# Patient Record
Sex: FEMALE | Race: WHITE | Employment: OTHER | URBAN - METROPOLITAN AREA
[De-identification: names, ages, dates, MRNs, and addresses within clinical notes are randomized per-mention and may not be internally consistent; named-entity substitution may affect disease eponyms.]

---

## 2018-11-14 ENCOUNTER — HOSPITAL ENCOUNTER (EMERGENCY)
Age: 68
Discharge: HOME | End: 2018-11-14
Payer: MEDICARE

## 2018-11-14 VITALS
DIASTOLIC BLOOD PRESSURE: 102 MMHG | OXYGEN SATURATION: 98 % | BODY MASS INDEX: 30.1 KG/M2 | HEIGHT: 71 IN | WEIGHT: 215 LBS | RESPIRATION RATE: 18 BRPM | HEART RATE: 72 BPM | TEMPERATURE: 97.6 F | SYSTOLIC BLOOD PRESSURE: 166 MMHG

## 2018-11-14 DIAGNOSIS — Z87.898 HISTORY OF EPISTAXIS: Primary | ICD-10-CM

## 2018-11-14 PROCEDURE — 99282 EMERGENCY DEPT VISIT SF MDM: CPT

## 2018-11-14 RX ORDER — OXYMETAZOLINE HYDROCHLORIDE 0.05 G/100ML
2 SPRAY NASAL 2 TIMES DAILY
Qty: 1 BOTTLE | Refills: 0 | Status: SHIPPED | OUTPATIENT
Start: 2018-11-14 | End: 2018-12-14

## 2018-11-14 NOTE — ED PROVIDER NOTES
eMERGENCY dEPARTMENT eNCOUnter        PCP: Elvi Egan MD    CHIEF COMPLAINT  Chief Complaint   Patient presents with    Epistaxis     ongoing for past 12 hours \"off and on\" per patient; denies known trauma; denies being on blood thinning medications        HPI    Nella Maguire is a 70 y.o. female who presents with intermittent nosebleed with onset of 4am today. Patient reports that it's in the right side of her nose that has been bleeding on and off slightly throughout today. Patient reports that it has been \"dripping\" today but denies having large amounts of blood loss. The context has been of spontaneous onsets. The patient is taking no blood thinners. The quality of the bleeding is bright red blood. There no aggravating or alleviating factors. Direct pressure and ice have stopped patient's nosebleed several times today. The patient has no associated symptoms. Patient denies fever, chills, spontaneous bruising, other spontaneous bleeding, lightheadedness, dizziness, hematochezia, melena, emesis, hematemesis. REVIEW OF SYSTEMS    General: No Fever  Cardiac: No Chest Pain, Denies near syncope / syncope. Respiratory: No shortness of breath or or difficulty breathing  GI: No abdominal pain. No Bloody Stool or dark stools. : No Dysuria or Hematuria  Neurologic: No LOC, no lightheadedness  Integument: No spontaneous bruising. No rash. All other review of systems are negative  See HPI and nursing notes for additional information       PAST MEDICAL & SURGICAL HISTORY    No past medical history on file. Past Surgical History:   Procedure Laterality Date    BREAST SURGERY      CHOLECYSTECTOMY      JOINT REPLACEMENT         CURRENT MEDICATIONS    Current Outpatient Rx   Medication Sig Dispense Refill    oxymetazoline (12 HOUR NASAL SPRAY) 0.05 % nasal spray 2 sprays by Nasal route 2 times daily . DO NOT USE LONGER THAN 3 DAYS.  1 Bottle 0    cloNIDine (CATAPRES) 0.2 MG tablet Take 0.2

## 2020-10-29 ENCOUNTER — OFFICE VISIT (OUTPATIENT)
Dept: PRIMARY CARE CLINIC | Age: 70
End: 2020-10-29
Payer: MEDICARE

## 2020-10-29 ENCOUNTER — HOSPITAL ENCOUNTER (OUTPATIENT)
Age: 70
Setting detail: SPECIMEN
Discharge: HOME | End: 2020-10-29
Payer: MEDICARE

## 2020-10-29 VITALS — TEMPERATURE: 97.3 F | OXYGEN SATURATION: 97 % | HEART RATE: 100 BPM

## 2020-10-29 PROCEDURE — U0002 COVID-19 LAB TEST NON-CDC: HCPCS

## 2020-10-29 PROCEDURE — 99211 OFF/OP EST MAY X REQ PHY/QHP: CPT | Performed by: INTERNAL MEDICINE

## 2020-10-30 LAB
SARS-COV-2: NOT DETECTED
SOURCE: NORMAL

## 2023-08-01 ENCOUNTER — TRANSCRIBE ORDERS (OUTPATIENT)
Dept: ADMINISTRATIVE | Age: 73
End: 2023-08-01

## 2023-08-01 DIAGNOSIS — G60.0 HEREDITARY SENSORY-MOTOR NEUROPATHY, TYPE III: Primary | ICD-10-CM

## 2023-08-16 ENCOUNTER — HOSPITAL ENCOUNTER (OUTPATIENT)
Dept: NEUROLOGY | Age: 73
Discharge: HOME | End: 2023-08-16
Payer: MEDICARE

## 2023-08-16 DIAGNOSIS — G60.0 HEREDITARY SENSORY-MOTOR NEUROPATHY, TYPE III: ICD-10-CM

## 2023-08-16 PROCEDURE — 95886 MUSC TEST DONE W/N TEST COMP: CPT

## 2023-08-16 PROCEDURE — 95910 NRV CNDJ TEST 7-8 STUDIES: CPT

## 2023-08-16 NOTE — PROCEDURES
[facility], [address] [facility]                             ELECTROMYOGRAM REPORT    PATIENT NAME: Sana Cannon               :        1950  MED REC NO:   [id number]                            ROOM:  ACCOUNT NO:   [de-identified]                           ADMIT DATE: 2023  PROVIDER:     Lenn Fothergill, MD    DATE OF EM2023    REFERRING PROVIDER: Elena Chong    REASON FOR STUDY:  The patient had right leg weakness and spasms. FINDINGS:  Motor nerve conduction velocities are normal in all the  nerves tested. F-wave latencies are delayed in all the nerves tested. Distal motor latencies are normal in all the nerves tested. Distal sensory latencies could not be obtained in the right superficial  peroneal nerve and are normal in other nerves tested. Amplitudes of motor and sensory responses are decreased in all the  nerves tested. On concentric needle electrode examination, mild denervation changes are  present in the right L5 root distribution. CLINICAL INTERPRETATION:  Electrodiagnostic studies are showing changes  of mild right L5 radiculopathy. The patient is being tried on conservative management. If clinically indicated, we shall proceed with imaging of the lumbar canal to look for compromise of the spinal canal  and/or foramina. The patient is being tried on conservative management. The patient has changes of peripheral neuropathic process by looking at  the delayed F-wave latencies and low amplitudes of motor and sensory  responses. If clinically indicated, we shall screen the patient for  causes of peripheral neuropathy such as diabetes mellitus,  hypothyroidism, exposure to toxins, autoimmune disorder, etc.    If clinically indicated, we could repeat the study in a year.         Lenn Fothergill, MD    D: 2023 14:21:08       T: 2023 14:25:48     [id number]  Job#:

## 2023-10-05 PROBLEM — M54.16 LUMBAR RADICULOPATHY: Status: ACTIVE | Noted: 2023-10-05

## 2023-10-05 PROBLEM — G89.29 CHRONIC PAIN OF RIGHT KNEE: Status: ACTIVE | Noted: 2023-10-05

## 2023-10-05 PROBLEM — M25.561 CHRONIC PAIN OF RIGHT KNEE: Status: ACTIVE | Noted: 2023-10-05

## 2023-10-05 PROBLEM — M16.11 PRIMARY OSTEOARTHRITIS OF RIGHT HIP: Status: ACTIVE | Noted: 2023-10-05

## 2023-10-12 ENCOUNTER — APPOINTMENT (OUTPATIENT)
Dept: PHYSICAL THERAPY | Facility: CLINIC | Age: 73
End: 2023-10-12
Payer: MEDICARE

## 2023-10-12 NOTE — PROGRESS NOTES
Physical Therapy Evaluation    Patient Name: Mary Carrillo  MRN: [id number]    Current Problem  No diagnosis found.  Insurance    Insurance reviewed   Visit number: **  Approved number of visits: **  **insurance name  **visits/yr or copay  **auth/no auth      Subjective   General: Patient is a 74 y/o F who is here today for c/o low back pain with RLE weakness.     Precautions:    Pain:    Reviewed medical screening form with pt and medical screening assessed    Imaging:     Objective   Posture:  Transfers:  Gait:    Lumbar ROM (* indicates pain)  Flex:   Ext:  R SB:   L SB:     Supine Hip PROM (* indicates pain)  Flexion: L ; R   IR: L ; R   ER: L ; R:    Hip MMT (* indicates pain)  Flex: L ; R  Ext: L ; R  ABD: L ; R    Repeated loaded flexion:   Repeated loaded ext:   SKTC:   DKTC:    WILL:   PPU:    Slump:   90/90:     Palpation:   Outcome Measures:  {PT Outcome Measures:87684}     Treatment: See HEP below    EDUCATION/HEP:    Assessment:      Clinical Presentation: Stable / Evolving / Unstable    Level of Complexity: Low / Moderate / High    Goals:      Plan  x/week for  visits

## 2023-11-07 ENCOUNTER — EVALUATION (OUTPATIENT)
Dept: PHYSICAL THERAPY | Facility: CLINIC | Age: 73
End: 2023-11-07
Payer: MEDICARE

## 2023-11-07 DIAGNOSIS — M54.16 LUMBAR RADICULOPATHY: Primary | ICD-10-CM

## 2023-11-07 PROCEDURE — 97161 PT EVAL LOW COMPLEX 20 MIN: CPT | Mod: GP

## 2023-11-07 PROCEDURE — 97110 THERAPEUTIC EXERCISES: CPT | Mod: GP

## 2023-11-07 ASSESSMENT — PAIN SCALES - GENERAL: PAINLEVEL_OUTOF10: 1

## 2023-11-07 ASSESSMENT — ENCOUNTER SYMPTOMS
OCCASIONAL FEELINGS OF UNSTEADINESS: 0
LOSS OF SENSATION IN FEET: 0
DEPRESSION: 0

## 2023-11-07 ASSESSMENT — PAIN - FUNCTIONAL ASSESSMENT: PAIN_FUNCTIONAL_ASSESSMENT: 0-10

## 2023-11-07 NOTE — PROGRESS NOTES
Physical Therapy Evaluation    Patient Name: Mary Carrillo  MRN: [id number]    Current Problem  1. Lumbar radiculopathy  Follow Up In Physical Therapy        Insurance    Insurance reviewed   Visit number: 1  Approved number of visits: BMN  [employer] Medicare  Visits BMN  No auth required      Subjective   General: Patient is a 72 y/o F who is here today for c/o low back pain and BLE weakness (R>L). She did have nerve conduction testing, which revealed BLE peripheral neuropathy. Pt feels that her RLE is not as strong as her LLE. Pt w/ h/o R THR in December of 2022. Pt denies numbness/tingling in BLEs, pt does have pain in anterior R quad at times, unable to identify aggravating factors. Pt denies any buckling/instability in BLEs. Pt denies any new onset bowel/bladder dysfunction, denies saddle anesthesia. Pt reports that her biggest complaint is her loss of strength and weakness, not so much her pain at this time.     Pt Goal for PT: improve strength and flexibility    Precautions:  none  Pain:  1/10, low back and BLEs  Pain Exacerbating Factors: walking, sit to/from stands, bending, lifting, carrying  Pain Relieving Factors: meloxicam, pilates, swimming    Reviewed medical screening form with pt and medical screening assessed    Objective   Posture: forward head, rounded shoulders, decreased lumbar lordosis  Transfers: increased use of UEs  Gait: increased R lateral trunk lean    Lumbar ROM (* indicates pain)  Flex: to floor   Ext: min reversal*  R SB: to distal thigh*  L SB: to distal thigh*    Supine Hip PROM (* indicates pain)  WNL B    Hip MMT (* indicates pain)  Flex: 4/5 L ; 4/5 R  Ext: 4-/5 L ; 4-/5 R  ABD: 4/5 L ; 4/5 R    Repeated loaded flexion: no change  Repeated loaded ext: no change  SKTC: no change  DKTC: no change  WILL: no change  PPU: no change    Slump: (-) B  90/90: lacking 15-20 degrees B    Palpation: slight TTP B lumbar paraspinals    Outcome Measures:  Other Measures  Lower Extremity Funtional  Score (LEFS): 51/80  Oswestry Disablity Index (CHUCKIE): 8/50; 16%     Treatment: See HEP below    EDUCATION/HEP:  Access Code: [email or web address]  URL: [email or web address]  Date: 11/07/2023  Prepared by: Hazel Kerns    Exercises  - Supine Lower Trunk Rotation  - 1 x daily - 7 x weekly - 1-2 sets - 10 reps - 5 sec hold  - Supine Piriformis Stretch with Foot on Ground  - 1 x daily - 7 x weekly - 3 sets - 30 sec hold  - Supine Figure 4 Piriformis Stretch  - 1 x daily - 7 x weekly - 3 sets - 30 sec hold  - Supine Posterior Pelvic Tilt  - 1 x daily - 7 x weekly - 1-2 sets - 10 reps - 5 sec hold  - Supine Bridge  - 1 x daily - 7 x weekly - 1-2 sets - 10 reps - 2-3 sec hold    Assessment:  Patient is a 72 y/o F who participated in PT evaluation this date for c/o low back and BLE pain and weakness. Patient presents with pain, decreased lumbar ROM and impaired tissue mobility, decreased BLE strength, impaired gait and impaired posture. Due to these impairments, pt has increased difficulty with lifting, carrying, walking, prolonged standing and sitting, transfers, performing recreational hobbies/activities, and performing ADLs/IADLs. Pt would benefit from PT services to decrease pain, increase ROM/tissue mobility, improve strength and posture to facilitate return to prior level of activities as able.     Problem List: activity limitations, ADLs/IADLs/self care skills, decreased functional level, decreased knowledge of HEP, decreased knowledge of precautions, flexibility, pain, participation restrictions, posture, range of motion/joint mobility and strength.      Clinical Presentation: Stable    Level of Complexity: Low     Goals:  Pt will report at least 75% improvement in low back and BLE pain during everyday activities.  Pt will demo >/= 4+/5 strength of BLEs without pain.  Pt will demo full and symmetrical AROM of lumbar spine without pain.  Pt will ambulate 50ft without gait deviation or pain.  Pt will  improve LEFS score by at least 9 points (MCID) to reflect improvement in ADLs/pain reduction, as well as improvement in functional abilities. Baseline 11/7/23: 51/80  Pt will demonstrate independence and report compliance with HEP.     Plan  1x/week for 8 visits     Planned interventions include: blood flow restriction, aquatic therapy, biofeedback, cryotherapy, dry needling, edema control, education/instruction, electrical stimulation, gait training, home program, hot pack, kinesiotaping, manual therapy, neuromuscular re-education, self care/home management, therapeutic activities, therapeutic exercises, ultrasound and vasopneumatic device w/ cold.

## 2023-11-07 NOTE — LETTER
November 7, 2023     Patient: Mary Carrillo   YOB: 1950   Date of Visit: 11/7/2023       To Whom it May Concern:    Mary Carrillo was seen in my clinic on 11/7/2023. She {Return to school/sport:58571}.    If you have any questions or concerns, please don't hesitate to call.         Sincerely,          Hazel Kerns, PT        CC: No Recipients

## 2023-11-07 NOTE — LETTER
November 7, 2023     Patient: Mary Carrillo   YOB: 1950   Date of Visit: 11/7/2023       To Whom It May Concern:    It is my medical opinion that Mary Carrillo {Work release (duty restriction):82729}.    If you have any questions or concerns, please don't hesitate to call.         Sincerely,        Hazel Kerns, PT    CC: No Recipients

## 2023-11-15 ENCOUNTER — TREATMENT (OUTPATIENT)
Dept: PHYSICAL THERAPY | Facility: CLINIC | Age: 73
End: 2023-11-15
Payer: MEDICARE

## 2023-11-15 DIAGNOSIS — M54.16 LUMBAR RADICULOPATHY: ICD-10-CM

## 2023-11-21 ENCOUNTER — TREATMENT (OUTPATIENT)
Dept: PHYSICAL THERAPY | Facility: CLINIC | Age: 73
End: 2023-11-21
Payer: MEDICARE

## 2023-11-21 DIAGNOSIS — M54.16 LUMBAR RADICULOPATHY: ICD-10-CM

## 2023-11-21 PROCEDURE — 97110 THERAPEUTIC EXERCISES: CPT | Mod: GP

## 2023-11-21 NOTE — PROGRESS NOTES
Physical Therapy Treatment    Patient Name: Mary Carrillo  MRN: [id number]    Current Problem  1. Lumbar radiculopathy  Follow Up In Physical Therapy      Insurance     Insurance reviewed   Visit number: 2  Approved number of visits: BMN  [employer] Medicare  Visits BMN  No auth required    Subjective   General  Pt reports feeling pretty good today, no c/o pain noted. Reports that her biggest complaint continues to be that she feels she has no strength in her RLE.  Precautions  none  Pain  0/10    Objective     Treatments:  AP: 6'  Bridge w/ hip abduction: GTB 2x10   Hooklying resisted march w/ TA activation: GTB 2x10 B  Hooklying SL bent knee fallout: GTB 2x10 B  Sit to/from stands w/out UE: GTB 2x10  Standing 3-way hip on black foam pad: RTB x15 each B    OP EDUCATION/HEP:  Access Code: [email or web address]  URL: [email or web address]  Date: 11/21/2023  Prepared by: Hazel Kerns    Exercises  - Bridge with Hip Abduction and Resistance - Ground Touches  - 1 x daily - 7 x weekly - 2 sets - 10 reps  - Supine March with Resistance Band  - 1 x daily - 7 x weekly - 2 sets - 10 reps  - Hooklying Single Leg Bent Knee Fallouts with Resistance  - 1 x daily - 7 x weekly - 2 sets - 10 reps  - Sit to Stand with Resistance Around Legs  - 1 x daily - 7 x weekly - 2 sets - 10 reps  - Standing Hip Flexion with Resistance Loop  - 1 x daily - 7 x weekly - 1-2 sets - 10 reps  - Hip Abduction with Resistance Loop  - 1 x daily - 7 x weekly - 1-2 sets - 10 reps  - Hip Extension with Resistance Loop  - 1 x daily - 7 x weekly - 1-2 sets - 10 reps    Assessment   Treatment session focused on hip and core strengthening this date. Added several new exercises, HEP updated as appropriate. Pt has no c/o pain w/ all exercises, only muscle fatigue as expected. PT to continue to address pain, ROM/tissue mobility, strength, gait and balance to facilitate return to prior level of activities as able.     Plan   Progress w/ POC, as  tolerated.

## 2023-11-28 ENCOUNTER — APPOINTMENT (OUTPATIENT)
Dept: PHYSICAL THERAPY | Facility: CLINIC | Age: 73
End: 2023-11-28
Payer: MEDICARE

## 2023-12-05 ENCOUNTER — OFFICE VISIT (OUTPATIENT)
Dept: ORTHOPEDIC SURGERY | Facility: CLINIC | Age: 73
End: 2023-12-05
Payer: MEDICARE

## 2023-12-05 ENCOUNTER — TREATMENT (OUTPATIENT)
Dept: PHYSICAL THERAPY | Facility: CLINIC | Age: 73
End: 2023-12-05
Payer: MEDICARE

## 2023-12-05 ENCOUNTER — ANCILLARY PROCEDURE (OUTPATIENT)
Dept: RADIOLOGY | Facility: CLINIC | Age: 73
End: 2023-12-05
Payer: MEDICARE

## 2023-12-05 DIAGNOSIS — Z47.1 AFTERCARE FOLLOWING RIGHT HIP JOINT REPLACEMENT SURGERY: Primary | ICD-10-CM

## 2023-12-05 DIAGNOSIS — Z96.641 STATUS POST RIGHT HIP REPLACEMENT: ICD-10-CM

## 2023-12-05 DIAGNOSIS — M54.16 LUMBAR RADICULOPATHY: ICD-10-CM

## 2023-12-05 DIAGNOSIS — Z96.641 AFTERCARE FOLLOWING RIGHT HIP JOINT REPLACEMENT SURGERY: Primary | ICD-10-CM

## 2023-12-05 PROCEDURE — 73502 X-RAY EXAM HIP UNI 2-3 VIEWS: CPT | Mod: RT

## 2023-12-05 PROCEDURE — 99213 OFFICE O/P EST LOW 20 MIN: CPT | Performed by: ORTHOPAEDIC SURGERY

## 2023-12-05 PROCEDURE — 1159F MED LIST DOCD IN RCRD: CPT | Performed by: ORTHOPAEDIC SURGERY

## 2023-12-05 PROCEDURE — 73502 X-RAY EXAM HIP UNI 2-3 VIEWS: CPT | Mod: RIGHT SIDE | Performed by: ORTHOPAEDIC SURGERY

## 2023-12-05 PROCEDURE — 1036F TOBACCO NON-USER: CPT | Performed by: ORTHOPAEDIC SURGERY

## 2023-12-05 PROCEDURE — 1125F AMNT PAIN NOTED PAIN PRSNT: CPT | Performed by: ORTHOPAEDIC SURGERY

## 2023-12-05 NOTE — PROGRESS NOTES
History of present illness    73-year-old female here for 1 year follow-up status post total hip replacement right she states she never really had any hip pain whatsoever since her hip surgery her biggest issue has been weakness on the right side it does not seem to be getting worse but does not seem to be getting any better.  She is ambulating without assist device no fevers or chills no numbness just weakness      Past medical , Surgical, Family and social history reviewed.      Physical exam  General: No acute distress and breathing comfortably.  Patient is pleasant and cooperative with the examination.    Extremity  Hip incision well-healed good range of motion neurologically intact distally brisk cap refill compartments soft calf is nontender    Diagnostics      XR hip right with pelvis when performed 2 or 3 views    Result Date: 12/5/2023  Interpreted By:  Steffi Thornton, STUDY: XR HIP RIGHT WITH PELVIS WHEN PERFORMED 2 OR 3 VIEWS;  ;  12/5/2023 9:30 am   INDICATION: Signs/Symptoms:S/P thr.   ACCESSION NUMBER(S): [id number]   ORDERING CLINICIAN: STEFFI THORNTON   FINDINGS: Two views show patient status post total hip replacement in good alignment.  No signs of fracture dislocation or other bony abnormality       Signed by: Steffi Thornton 12/5/2023 9:38 AM Dictation workstation:   [id number]       Procedure  [ none]    Assessment  Status post right total hip replacement    Treatment plan  1.  Continue work on range of motion strengthening continue to weight-bear as tolerated follow-up if she has increased pain or discomfort otherwise as needed.  2.  She did have an extensive workup including EMG for her weakness she does have some peripheral neuropathy but on both lower extremities.  3. [   ]  4.  All of the patient's questions were answered.    This note was prepared using voice recognition software.  The details of this note are correct and have been reviewed, and corrected to  the best of my ability.  Some grammatical areas may persist related to the Dragon software    Jim Thornton MD  Senior Attending Physician  [facility]  [facility] [place]    [phone]

## 2023-12-12 ENCOUNTER — APPOINTMENT (OUTPATIENT)
Dept: PHYSICAL THERAPY | Facility: CLINIC | Age: 73
End: 2023-12-12
Payer: MEDICARE

## 2023-12-18 ENCOUNTER — HOSPITAL ENCOUNTER (OUTPATIENT)
Dept: RADIOLOGY | Facility: HOSPITAL | Age: 73
Discharge: HOME | End: 2023-12-18
Payer: MEDICARE

## 2023-12-18 DIAGNOSIS — G60.0 HEREDITARY MOTOR AND SENSORY NEUROPATHY: ICD-10-CM

## 2023-12-18 DIAGNOSIS — R53.1 WEAKNESS: ICD-10-CM

## 2023-12-18 DIAGNOSIS — M54.16 RADICULOPATHY, LUMBAR REGION: ICD-10-CM

## 2023-12-18 PROCEDURE — 72148 MRI LUMBAR SPINE W/O DYE: CPT

## 2023-12-18 PROCEDURE — 72148 MRI LUMBAR SPINE W/O DYE: CPT | Performed by: RADIOLOGY

## 2023-12-19 ENCOUNTER — APPOINTMENT (OUTPATIENT)
Dept: PHYSICAL THERAPY | Facility: CLINIC | Age: 73
End: 2023-12-19
Payer: MEDICARE

## 2023-12-27 ENCOUNTER — APPOINTMENT (OUTPATIENT)
Dept: PHYSICAL THERAPY | Facility: CLINIC | Age: 73
End: 2023-12-27
Payer: MEDICARE

## 2025-01-08 DIAGNOSIS — M54.2 NECK PAIN: ICD-10-CM

## 2025-01-09 ENCOUNTER — APPOINTMENT (OUTPATIENT)
Dept: ORTHOPEDIC SURGERY | Facility: CLINIC | Age: 75
End: 2025-01-09
Payer: MEDICARE

## 2025-01-09 ENCOUNTER — ANCILLARY PROCEDURE (OUTPATIENT)
Dept: ORTHOPEDIC SURGERY | Facility: CLINIC | Age: 75
End: 2025-01-09
Payer: MEDICARE

## 2025-01-09 DIAGNOSIS — M54.2 CERVICAL PAIN (NECK): ICD-10-CM

## 2025-01-09 DIAGNOSIS — M54.2 NECK PAIN: ICD-10-CM

## 2025-01-09 DIAGNOSIS — M54.81 OCCIPITAL NEURALGIA OF RIGHT SIDE: ICD-10-CM

## 2025-01-09 DIAGNOSIS — M47.812 CERVICAL SPONDYLOSIS: Primary | ICD-10-CM

## 2025-01-09 PROCEDURE — 99204 OFFICE O/P NEW MOD 45 MIN: CPT | Performed by: PHYSICAL MEDICINE & REHABILITATION

## 2025-01-09 PROCEDURE — 1125F AMNT PAIN NOTED PAIN PRSNT: CPT | Performed by: PHYSICAL MEDICINE & REHABILITATION

## 2025-01-09 PROCEDURE — 1159F MED LIST DOCD IN RCRD: CPT | Performed by: PHYSICAL MEDICINE & REHABILITATION

## 2025-01-09 PROCEDURE — 1157F ADVNC CARE PLAN IN RCRD: CPT | Performed by: PHYSICAL MEDICINE & REHABILITATION

## 2025-01-09 RX ORDER — TRIAMTERENE AND HYDROCHLOROTHIAZIDE 37.5; 25 MG/1; MG/1
CAPSULE ORAL
COMMUNITY
Start: 2024-09-30

## 2025-01-09 RX ORDER — PREDNISONE 10 MG/1
10 TABLET ORAL DAILY
Qty: 7 TABLET | Refills: 0 | Status: SHIPPED | OUTPATIENT
Start: 2025-01-09

## 2025-01-09 RX ORDER — TRETINOIN 0.25 MG/G
1 GEL TOPICAL
COMMUNITY
Start: 2024-12-30

## 2025-01-09 RX ORDER — METFORMIN HYDROCHLORIDE 500 MG/1
1 TABLET ORAL
COMMUNITY
Start: 2025-01-07

## 2025-01-09 RX ORDER — SERTRALINE HYDROCHLORIDE 50 MG/1
TABLET, FILM COATED ORAL
COMMUNITY
Start: 2024-12-02

## 2025-01-09 RX ORDER — CLONIDINE HYDROCHLORIDE 0.3 MG/1
0.3 TABLET ORAL 2 TIMES DAILY
COMMUNITY
Start: 2024-09-27 | End: 2025-03-26

## 2025-01-09 ASSESSMENT — PAIN SCALES - GENERAL: PAINLEVEL_OUTOF10: 4

## 2025-01-09 ASSESSMENT — PAIN - FUNCTIONAL ASSESSMENT: PAIN_FUNCTIONAL_ASSESSMENT: 0-10

## 2025-01-09 ASSESSMENT — PAIN DESCRIPTION - DESCRIPTORS: DESCRIPTORS: SHARP;STABBING;OTHER (COMMENT)

## 2025-01-09 NOTE — PROGRESS NOTES
PM&R  / Ortho clinic eval:    IMPRESSION:    Severe right sided neck pain, probably referred upper cervical facet arthropathy, but could be some element of occipital neuralgia  Exacerbation from riding roller coasters summer 2024 lingering despite conservative management    RECOMMENDATIONS:  -I personally interpreted cervical x-rays from today, there is advanced disc degeneration in the mid cervical region with reactive facet arthropathy, but also possibly congenital fusion of the upper cervical posterior elements?  - MRI csp is necessary because of failure of conservative management, severe pain, despite PT  and chiro which started Oct 28/24 with Estim, massage, dry needling and home exercise  -Can repeat low-dose prednisone 5-day course starting February 1 if necessary but in the meantime aspirin, meloxicam, ibuprofen or Aleve over-the-counter would be fine  - Add gabapentin (some element of occipital neuralgia) 100mg   f/u next avail after cervical MRI but she's trying to get to [place] for the roe so I'm ordering Medial branch blocks at 4 nerves -C3-4-5 6, may need to refine levels after imaging       Diagnoses and plan discussed with the patient, patient educated on above diagnoses and treatments, including alternatives     Jakob Felipe MD, FAAPMR, R-MSK  Chief, Division of PM&R  Board Certified in PM&R and Sports Medicine    ____________________________________________________________________    1/9/2025    CC: Patient complains of neck pain    HPI:   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  for  Neck pain.   Started after riding roller coaster with young kids in Summer '24 - felt right away but gradually worse after.   The pain is both sharp and stabbing but also achy, shoots up to back of head, more achy at neck,  increases with first few hours in morning,  and is relieved by nothing - prednisone  recently helps.   ASA helped more than meloxicam    Pain scale  5/10 on average and 9/10 worst pain in past week.             Patient reports no fevers, chills, sweats, night pain, weight loss - POS breast CA history in 2000s, s/p chemo and partial mastecomy. No bowel, bladder Sx    Pertinent Physical Exam:  MSK: Cervical Spine: ROM severely reduced all planes with reproduction of some mid cervical pain and right lateral bending and rotation,, Posture mildly kyphotic, Tender mid cervical paraspinal especially on the right, minimally tender in occiput, Spurling negative  Neuro: Normal Sensation, strength, bulk and tone of upper and  lower limbs bilaterally, reflexes normal.      SUPPORTING DOCUMENTATION (remaining history, exam, other findings):  Work-up reviewed - this has included x-ray cervical    Treatment has included PT  and chiro which started Oct 28/24 with Estim, massage, dry needling and home exercise  -low-dose prednisone 5-day course aspirin, meloxicam, ibuprofen or Aleve over-the-counter   See above for Assessment and Plan

## 2025-01-22 ENCOUNTER — HOSPITAL ENCOUNTER (OUTPATIENT)
Dept: RADIOLOGY | Facility: CLINIC | Age: 75
Discharge: HOME | End: 2025-01-22
Payer: MEDICARE

## 2025-01-22 DIAGNOSIS — M54.2 CERVICAL PAIN (NECK): ICD-10-CM

## 2025-01-22 DIAGNOSIS — M47.812 CERVICAL SPONDYLOSIS: ICD-10-CM

## 2025-01-22 DIAGNOSIS — M54.81 OCCIPITAL NEURALGIA OF RIGHT SIDE: ICD-10-CM

## 2025-01-22 PROCEDURE — 72141 MRI NECK SPINE W/O DYE: CPT

## 2025-01-22 PROCEDURE — 72141 MRI NECK SPINE W/O DYE: CPT | Performed by: RADIOLOGY

## 2025-01-23 ENCOUNTER — APPOINTMENT (OUTPATIENT)
Dept: ORTHOPEDIC SURGERY | Facility: CLINIC | Age: 75
End: 2025-01-23
Payer: MEDICARE

## 2025-01-24 ENCOUNTER — TELEPHONE (OUTPATIENT)
Dept: ORTHOPEDIC SURGERY | Facility: CLINIC | Age: 75
End: 2025-01-24
Payer: MEDICARE

## 2025-01-24 DIAGNOSIS — M47.812 CERVICAL SPONDYLOSIS: ICD-10-CM

## 2025-01-24 DIAGNOSIS — M54.81 OCCIPITAL NEURALGIA OF RIGHT SIDE: ICD-10-CM

## 2025-01-24 DIAGNOSIS — M54.2 CERVICAL PAIN (NECK): ICD-10-CM

## 2025-01-24 RX ORDER — PREDNISONE 10 MG/1
10 TABLET ORAL DAILY
Qty: 7 TABLET | Refills: 0 | Status: SHIPPED | OUTPATIENT
Start: 2025-01-24

## 2025-01-24 NOTE — TELEPHONE ENCOUNTER
Ok - but it was prednisone, and I used lower dose so she could get longer duration/use more.  I sent a new one (prednisone, just 10mg per day)

## 2025-02-28 ENCOUNTER — APPOINTMENT (OUTPATIENT)
Dept: OPERATING ROOM | Facility: CLINIC | Age: 75
End: 2025-02-28
Payer: MEDICARE

## 2025-02-28 VITALS
RESPIRATION RATE: 17 BRPM | OXYGEN SATURATION: 98 % | DIASTOLIC BLOOD PRESSURE: 70 MMHG | BODY MASS INDEX: 31.14 KG/M2 | HEIGHT: 71 IN | SYSTOLIC BLOOD PRESSURE: 139 MMHG | WEIGHT: 222.44 LBS | TEMPERATURE: 96.8 F | HEART RATE: 79 BPM

## 2025-02-28 DIAGNOSIS — M54.81 OCCIPITAL NEURALGIA OF RIGHT SIDE: ICD-10-CM

## 2025-02-28 DIAGNOSIS — M54.2 CERVICAL PAIN (NECK): ICD-10-CM

## 2025-02-28 DIAGNOSIS — M47.812 CERVICAL SPONDYLOSIS: ICD-10-CM

## 2025-02-28 PROCEDURE — 7100000009 HC PHASE TWO TIME - INITIAL BASE CHARGE

## 2025-02-28 PROCEDURE — 3600000006 HC OR TIME - EACH INCREMENTAL 1 MINUTE - PROCEDURE LEVEL ONE

## 2025-02-28 PROCEDURE — 2550000001 HC RX 255 CONTRASTS: Performed by: PHYSICAL MEDICINE & REHABILITATION

## 2025-02-28 PROCEDURE — 2500000004 HC RX 250 GENERAL PHARMACY W/ HCPCS (ALT 636 FOR OP/ED): Performed by: PHYSICAL MEDICINE & REHABILITATION

## 2025-02-28 PROCEDURE — 7100000010 HC PHASE TWO TIME - EACH INCREMENTAL 1 MINUTE

## 2025-02-28 PROCEDURE — 3600000001 HC OR TIME - INITIAL BASE CHARGE - PROCEDURE LEVEL ONE

## 2025-02-28 RX ORDER — INDOMETHACIN 25 MG/1
CAPSULE ORAL AS NEEDED
Status: COMPLETED | OUTPATIENT
Start: 2025-02-28 | End: 2025-02-28

## 2025-02-28 RX ORDER — LIDOCAINE HYDROCHLORIDE 10 MG/ML
INJECTION, SOLUTION EPIDURAL; INFILTRATION; INTRACAUDAL; PERINEURAL AS NEEDED
Status: COMPLETED | OUTPATIENT
Start: 2025-02-28 | End: 2025-02-28

## 2025-02-28 RX ADMIN — SODIUM BICARBONATE 1 ML: 84 INJECTION, SOLUTION INTRAVENOUS at 10:21

## 2025-02-28 RX ADMIN — LIDOCAINE HYDROCHLORIDE 10 ML: 10 INJECTION, SOLUTION EPIDURAL; INFILTRATION; INTRACAUDAL; PERINEURAL at 10:21

## 2025-02-28 RX ADMIN — IOHEXOL 2 ML: 240 INJECTION, SOLUTION INTRATHECAL; INTRAVASCULAR; INTRAVENOUS; ORAL at 10:21

## 2025-02-28 ASSESSMENT — PAIN DESCRIPTION - DESCRIPTORS: DESCRIPTORS: ACHING

## 2025-02-28 ASSESSMENT — ENCOUNTER SYMPTOMS
NAUSEA: 0
BACK PAIN: 1
ARTHRALGIAS: 1
CHILLS: 0
APNEA: 0
AGITATION: 0
FEVER: 0
SHORTNESS OF BREATH: 0
ABDOMINAL PAIN: 0
CHEST TIGHTNESS: 0

## 2025-02-28 ASSESSMENT — PAIN - FUNCTIONAL ASSESSMENT: PAIN_FUNCTIONAL_ASSESSMENT: 0-10

## 2025-02-28 ASSESSMENT — PAIN SCALES - GENERAL: PAINLEVEL_OUTOF10: 7

## 2025-02-28 ASSESSMENT — COLUMBIA-SUICIDE SEVERITY RATING SCALE - C-SSRS
2. HAVE YOU ACTUALLY HAD ANY THOUGHTS OF KILLING YOURSELF?: NO
1. IN THE PAST MONTH, HAVE YOU WISHED YOU WERE DEAD OR WISHED YOU COULD GO TO SLEEP AND NOT WAKE UP?: NO
6. HAVE YOU EVER DONE ANYTHING, STARTED TO DO ANYTHING, OR PREPARED TO DO ANYTHING TO END YOUR LIFE?: NO

## 2025-02-28 NOTE — DISCHARGE INSTRUCTIONS
"Post Procedure Instructions     1. You MUST have a  to take you home and be available to assist you at home if needed, unless specifically arranged before procedure.     2. Get up from your seated or lying position slowly and carefully. It is not unusual to have some \"numbness\" in your back or legs following a lumbar injection. After a cervical injection it is not abnormal to have arm or neck numbness. The symptoms (if they occur) may last a few hours, but will wear off. Have someone assist you as you walk if numbness in your legs occurs.     3. Complete sensory block is the intent of an injection to nerves. Occasionally in trying to achieve sensory blockade you also receive a motor blockade (weak arm or leg) 4. If you receive sedation, do not drive a motorized vehicle for 24 hours.     5. Avoid ALL alcoholic beverages the day of your procedure.     6. Discuss sleep and pain medications with your prescribing physician if you received sedation.     7. It is safe to resume medications once home.     8. Tomorrow you may resume regular activities to the level your pain will tolerate. The exception is no lifting over 20 pounds for 36 hours. You will likely experience a temporary exacerbation of pre-injection pain when anesthetic medication wears off.     9. Remove your band aid tomorrow.     10. Do not take a tub bath or submerge in water for 48 hours. You may shower.     11. Apply ice to your injection site if it is swollen or hurts after the injection. Only apply 20 minutes on, then off for 20 minutes. Take the ice off as you sleep.     12. Call scheduling office at [phone] to verify a follow-up appointment 4-6 weeks after your procedure / injection with your pain diary.     13. Call your physician immediately or go to the emergency room for any of the following symptoms:    Severe pain at your injection site (tightness or aching is normal)    Pain, redness, pus, or leaking fluid at the injection site    " "Prolonged or increasing numbness 14 hours after a block    A temperature over 101.5 degrees F and / or chills    Excessive bruising, bleeding, or swelling at the injection site    Loss of bowel or bladder control or \"saddle anesthesia\"    Inability to speak, facial droop, and / or limb paralysis     Note: if you have any questions please contact our office at [phone], if the office is closed please call the after hours phone number at [phone] and ask for the pain resident on call    COLLINS Parker M.D.  Medicine and Rehabilitation, Orthopedic [facility]     To schedule FOLLOW-UP appointments, please use the call center at [phone].   To schedule future PROCEDURES or for questions for the doctor please call [phone].    "

## 2025-02-28 NOTE — H&P
History Of Present Illness  Mary Carrillo is a 74 y.o. female presenting with chronic right cervical pain refractory to conservative treatment, achy and burning, worse with activity, better at rest, trying to avoid surgery but interested in RFA.  Pain can be a 9/10.  Referred by Dr. Felipe.      Past Medical History  Past Medical History:   Diagnosis Date    Personal history of malignant neoplasm, unspecified     History of malignant neoplasm    Personal history of other diseases of the circulatory system     History of hypertension    Personal history of other diseases of the musculoskeletal system and connective tissue     History of arthritis       Surgical History  Past Surgical History:   Procedure Laterality Date    OTHER SURGICAL HISTORY  10/11/2022    Lithotripsy    OTHER SURGICAL HISTORY  10/11/2022    Mastectomy        Social History  She reports that she has never smoked. She has never used smokeless tobacco. No history on file for alcohol use and drug use.    Family History  No family history on file.     Allergies  Iodine    Review of Systems   Constitutional:  Negative for chills and fever.   Respiratory:  Negative for apnea, chest tightness and shortness of breath.    Cardiovascular:  Negative for chest pain.   Gastrointestinal:  Negative for abdominal pain and nausea.   Musculoskeletal:  Positive for arthralgias and back pain.   Psychiatric/Behavioral:  Negative for agitation.    All other systems reviewed and are negative.       Physical Exam  Vitals reviewed.   Constitutional:       Appearance: Normal appearance.   HENT:      Head: Atraumatic.   Pulmonary:      Effort: Pulmonary effort is normal.      Breath sounds: Normal breath sounds.   Neurological:      Mental Status: She is alert and oriented to person, place, and time. Mental status is at baseline.   Psychiatric:         Mood and Affect: Mood normal.         Behavior: Behavior is cooperative.          Last Recorded Vitals  Blood  "pressure 117/79, pulse 83, temperature 36.8 °C (98.2 °F), temperature source Skin, resp. rate 16, height 1.803 m (5' 11\"), weight 101 kg (222 lb 7.1 oz), SpO2 96%.    Relevant Results           Assessment/Plan   Assessment & Plan  Cervical pain (neck)    Cervical spondylosis    Occipital neuralgia of right side    Right C4, C5, and C6 medial branch blocks      Meg Parker MD    "

## 2025-03-05 ENCOUNTER — TELEPHONE (OUTPATIENT)
Dept: ORTHOPEDIC SURGERY | Facility: CLINIC | Age: 75
End: 2025-03-05
Payer: MEDICARE

## 2025-03-05 NOTE — TELEPHONE ENCOUNTER
Spoke to Mary on the phone, Pain scale 10/10 prior to cervical MBB.  Patient states pain scale 1/10 for the first 4 hours post procedure and then returned to 2/10 the next 2 hours and returned to 8/10 7 hours post procedure.  Patient is happy with the results, looking forward to her next MBB and RFA.

## 2025-03-06 ENCOUNTER — APPOINTMENT (OUTPATIENT)
Dept: ORTHOPEDIC SURGERY | Facility: CLINIC | Age: 75
End: 2025-03-06
Payer: MEDICARE

## 2025-03-07 ENCOUNTER — PATIENT MESSAGE (OUTPATIENT)
Dept: ORTHOPEDIC SURGERY | Facility: CLINIC | Age: 75
End: 2025-03-07
Payer: MEDICARE

## 2025-03-07 DIAGNOSIS — M54.81 OCCIPITAL NEURALGIA OF RIGHT SIDE: ICD-10-CM

## 2025-03-07 DIAGNOSIS — M47.812 CERVICAL SPONDYLOSIS: ICD-10-CM

## 2025-03-07 DIAGNOSIS — M54.2 CERVICAL PAIN (NECK): ICD-10-CM

## 2025-03-10 RX ORDER — PREDNISONE 10 MG/1
10 TABLET ORAL DAILY
Qty: 7 TABLET | Refills: 0 | Status: SHIPPED | OUTPATIENT
Start: 2025-03-10

## 2025-03-11 RX ORDER — METHOCARBAMOL 500 MG/1
TABLET, FILM COATED ORAL
Qty: 60 TABLET | Refills: 1 | Status: SHIPPED | OUTPATIENT
Start: 2025-03-11

## 2025-03-14 ENCOUNTER — HOSPITAL ENCOUNTER (OUTPATIENT)
Dept: OPERATING ROOM | Facility: CLINIC | Age: 75
Discharge: HOME | End: 2025-03-14
Payer: MEDICARE

## 2025-03-14 VITALS
TEMPERATURE: 96.8 F | OXYGEN SATURATION: 96 % | HEIGHT: 71 IN | RESPIRATION RATE: 16 BRPM | WEIGHT: 219.36 LBS | BODY MASS INDEX: 30.71 KG/M2

## 2025-03-14 DIAGNOSIS — M54.81 OCCIPITAL NEURALGIA OF RIGHT SIDE: ICD-10-CM

## 2025-03-14 DIAGNOSIS — M47.812 CERVICAL SPONDYLOSIS: ICD-10-CM

## 2025-03-14 DIAGNOSIS — M54.2 CERVICAL PAIN (NECK): ICD-10-CM

## 2025-03-14 ASSESSMENT — ENCOUNTER SYMPTOMS
CHILLS: 0
NAUSEA: 0
AGITATION: 0
APNEA: 0
BACK PAIN: 1
ARTHRALGIAS: 1
ABDOMINAL PAIN: 0
CHEST TIGHTNESS: 0
SHORTNESS OF BREATH: 0
FEVER: 0

## 2025-03-14 ASSESSMENT — COLUMBIA-SUICIDE SEVERITY RATING SCALE - C-SSRS
6. HAVE YOU EVER DONE ANYTHING, STARTED TO DO ANYTHING, OR PREPARED TO DO ANYTHING TO END YOUR LIFE?: NO
2. HAVE YOU ACTUALLY HAD ANY THOUGHTS OF KILLING YOURSELF?: NO
1. IN THE PAST MONTH, HAVE YOU WISHED YOU WERE DEAD OR WISHED YOU COULD GO TO SLEEP AND NOT WAKE UP?: NO

## 2025-03-14 ASSESSMENT — PAIN SCALES - GENERAL: PAINLEVEL_OUTOF10: 6

## 2025-03-14 ASSESSMENT — PAIN DESCRIPTION - DESCRIPTORS: DESCRIPTORS: ACHING

## 2025-03-14 ASSESSMENT — PAIN - FUNCTIONAL ASSESSMENT: PAIN_FUNCTIONAL_ASSESSMENT: 0-10

## 2025-03-14 NOTE — DISCHARGE INSTRUCTIONS
"Post Procedure Instructions     1. You MUST have a  to take you home and be available to assist you at home if needed, unless specifically arranged before procedure.     2. Get up from your seated or lying position slowly and carefully. It is not unusual to have some \"numbness\" in your back or legs following a lumbar injection. After a cervical injection it is not abnormal to have arm or neck numbness. The symptoms (if they occur) may last a few hours, but will wear off. Have someone assist you as you walk if numbness in your legs occurs.     3. Complete sensory block is the intent of an injection to nerves. Occasionally in trying to achieve sensory blockade you also receive a motor blockade (weak arm or leg) 4. If you receive sedation, do not drive a motorized vehicle for 24 hours.     5. Avoid ALL alcoholic beverages the day of your procedure.     6. Discuss sleep and pain medications with your prescribing physician if you received sedation.     7. It is safe to resume medications once home.     8. Tomorrow you may resume regular activities to the level your pain will tolerate. The exception is no lifting over 20 pounds for 36 hours. You will likely experience a temporary exacerbation of pre-injection pain when anesthetic medication wears off.     9. Remove your band aid tomorrow.     10. Do not take a tub bath or submerge in water for 48 hours. You may shower.     11. Apply ice to your injection site if it is swollen or hurts after the injection. Only apply 20 minutes on, then off for 20 minutes. Take the ice off as you sleep.     12. Call scheduling office at [phone] to verify a follow-up appointment 4-6 weeks after your procedure / injection with your pain diary.     13. Call your physician immediately or go to the emergency room for any of the following symptoms:    Severe pain at your injection site (tightness or aching is normal)    Pain, redness, pus, or leaking fluid at the injection site    " "Prolonged or increasing numbness 14 hours after a block    A temperature over 101.5 degrees F and / or chills    Excessive bruising, bleeding, or swelling at the injection site    Loss of bowel or bladder control or \"saddle anesthesia\"    Inability to speak, facial droop, and / or limb paralysis     Note: if you have any questions please contact our office at [phone], if the office is closed please call the after hours phone number at [phone] and ask for the pain resident on call    COLLINS Parker M.D.  Medicine and Rehabilitation, Orthopedic [facility]     To schedule FOLLOW-UP appointments, please use the call center at [phone].   To schedule future PROCEDURES or for questions for the doctor please call [phone].    "

## 2025-03-14 NOTE — H&P
History Of Present Illness  Mary Carrillo is a 74 y.o. female presenting with chronic right cervical pain refractory to conservative treatment, achy and burning, worse with activity, better at rest, trying to avoid surgery but interested in RFA. Unfortunately she was not very happy with her relief following her initial MBB.  Endorsing 50-60% improvement at most and this was not sig. For her.  Due to this we decided to cancel todays procedure and she will follow-up in office.  Of note, she is also currently on an oral steroid taper which can cloud the picture of relief.      Past Medical History  Past Medical History:   Diagnosis Date    Personal history of malignant neoplasm, unspecified     History of malignant neoplasm    Personal history of other diseases of the circulatory system     History of hypertension    Personal history of other diseases of the musculoskeletal system and connective tissue     History of arthritis       Surgical History  Past Surgical History:   Procedure Laterality Date    OTHER SURGICAL HISTORY  10/11/2022    Lithotripsy    OTHER SURGICAL HISTORY  10/11/2022    Mastectomy        Social History  She reports that she has never smoked. She has never used smokeless tobacco. No history on file for alcohol use and drug use.    Family History  No family history on file.     Allergies  Iodine    Review of Systems   Constitutional:  Negative for chills and fever.   Respiratory:  Negative for apnea, chest tightness and shortness of breath.    Cardiovascular:  Negative for chest pain.   Gastrointestinal:  Negative for abdominal pain and nausea.   Musculoskeletal:  Positive for arthralgias and back pain.   Psychiatric/Behavioral:  Negative for agitation.    All other systems reviewed and are negative.       Physical Exam  Vitals reviewed.   Constitutional:       Appearance: Normal appearance.   HENT:      Head: Atraumatic.   Pulmonary:      Effort: Pulmonary effort is normal.      Breath  "sounds: Normal breath sounds.   Neurological:      Mental Status: She is alert and oriented to person, place, and time. Mental status is at baseline.   Psychiatric:         Mood and Affect: Mood normal.         Behavior: Behavior is cooperative.          Last Recorded Vitals  Temperature 36 °C (96.8 °F), temperature source Skin, resp. rate 16, height 1.803 m (5' 11\"), weight 99.5 kg (219 lb 5.7 oz), SpO2 96%.    Relevant Results         Assessment/Plan   Assessment & Plan  Cervical pain (neck)    Cervical spondylosis    Occipital neuralgia of right side    Will cancel her scheduled. Right C4, C5, and C6 medial branch blocks      Meg Parker MD    "

## 2025-04-10 ENCOUNTER — APPOINTMENT (OUTPATIENT)
Dept: ORTHOPEDIC SURGERY | Facility: CLINIC | Age: 75
End: 2025-04-10
Payer: MEDICARE

## 2025-04-10 DIAGNOSIS — M47.812 CERVICAL SPONDYLOSIS: Primary | ICD-10-CM

## 2025-04-10 PROCEDURE — 1125F AMNT PAIN NOTED PAIN PRSNT: CPT | Performed by: PHYSICAL MEDICINE & REHABILITATION

## 2025-04-10 PROCEDURE — 1159F MED LIST DOCD IN RCRD: CPT | Performed by: PHYSICAL MEDICINE & REHABILITATION

## 2025-04-10 PROCEDURE — 99213 OFFICE O/P EST LOW 20 MIN: CPT | Performed by: PHYSICAL MEDICINE & REHABILITATION

## 2025-04-10 PROCEDURE — 1157F ADVNC CARE PLAN IN RCRD: CPT | Performed by: PHYSICAL MEDICINE & REHABILITATION

## 2025-04-10 RX ORDER — ACETAMINOPHEN AND CODEINE PHOSPHATE 300; 30 MG/1; MG/1
1 TABLET ORAL EVERY 6 HOURS PRN
Qty: 20 TABLET | Refills: 0 | Status: SHIPPED | OUTPATIENT
Start: 2025-04-10 | End: 2025-04-17

## 2025-04-10 RX ORDER — GABAPENTIN 100 MG/1
100 CAPSULE ORAL NIGHTLY
COMMUNITY
Start: 2025-04-09

## 2025-04-10 RX ORDER — METOPROLOL SUCCINATE 25 MG/1
25 TABLET, EXTENDED RELEASE ORAL
COMMUNITY
Start: 2025-04-09 | End: 2025-07-08

## 2025-04-10 ASSESSMENT — PAIN SCALES - GENERAL: PAINLEVEL_OUTOF10: 8

## 2025-04-10 ASSESSMENT — PAIN - FUNCTIONAL ASSESSMENT: PAIN_FUNCTIONAL_ASSESSMENT: 0-10

## 2025-04-10 ASSESSMENT — PAIN DESCRIPTION - DESCRIPTORS: DESCRIPTORS: ACHING;STABBING

## 2025-04-10 NOTE — PROGRESS NOTES
PM&R  / Ortho clinic eval:    IMPRESSION:    Severe right sided neck pain, probably referred upper cervical facet arthropathy, but could be some element of occipital neuralgia  Exacerbation from riding roller coasters summer 2024 lingering despite conservative management    RECOMMENDATIONS:  -interpreted MRI csp images   -will ask Dr Steve to do MBB or facet procedures for upper cervical spine on right , hopefully soon  -try low dose T#3 just to help sleep  -in the meantime aspirin, meloxicam,gabapentin at night, ibuprofen or Aleve over-the-counter would be fine   f/u next avail after MBBs     Diagnoses and plan discussed with the patient, patient educated on above diagnoses and treatments, including alternatives     Jakob Felipe MD, FAAPMR, R-MSK  Chief, Division of PM&R  Board Certified in PM&R and Sports Medicine    ____________________________________________________________________    4/10/2025    CC: Patient complains of neck pain    HPI:   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  Pain severe unchanged 8/10   prednisone low dose didn't help.      Recall, Seen at the request of self but via Chelsy Santoro (former Med student, now [facility] doc).  for  Neck pain.   Started after riding roller coaster with young kids in Summer '24 - felt right away but gradually worse after.   The pain is both sharp and stabbing but also achy, shoots up to back of head, more achy at neck,  increases with first few hours in morning,  and is relieved by nothing - prednisone recently helps.   ASA helped more than meloxicam             Patient reports no fevers, chills, sweats, night pain, weight loss - POS breast CA history in 2000s, s/p chemo and partial mastecomy. No bowel, bladder Sx    Pertinent Physical Exam:  MSK: Cervical Spine: ROM severely reduced all planes with reproduction of some mid cervical pain and right lateral bending and  rotation,, Posture mildly kyphotic, Tender mid cervical paraspinal especially on the right, minimally tender in occiput, Spurling negative  Neuro: Normal Sensation, strength, bulk and tone of upper and  lower limbs bilaterally, reflexes normal.      SUPPORTING DOCUMENTATION (remaining history, exam, other findings):  Work-up reviewed - this has included x-ray cervical    Treatment has included PT  and chiro which started Oct 28/24 with [facility], massage, dry needling and home exercise  -low-dose prednisone 5-day course aspirin, meloxicam, ibuprofen or Aleve over-the-counter   See above for Assessment and Plan

## 2025-04-14 NOTE — PROGRESS NOTES
History of Present Complaint:  The patient was referred to us by Referring Provider: Jakob Estes. this is a very pleasant retired  and a  74 y.o.  female with a past history of anxiety/depression on Zoloft, obesity BMI 31, diabetes, arthritis and musculoskeletal pain with severe right-sided neck pain failed to respond to MBB by Dr. Parker without much benefit presenting with right-sided neck pain but the worst pain is shooting pain behind the ear to her right occiput.  The patient does not have any pain in her upper extremities no numbness no tingling no paralysis.  The patient does not have any balance issues.  The patient denies any red flags        Procedures:   2/28/2025 right C4-6 MBB by Dr. Parker without much benefit       Portions of record reviewed for pertinent issues: active problem list, medication list, allergies, family history, social history, notes from last encounter, encounters, lab results, imaging and other available records.    I have personally reviewed the O[facility] report for this patient. This report is scanned into the electronic medical record. I have considered the risks of abuse, dependence, addiction and diversion. It showed: Gabapentin 100 mg and few T3  OPIOID RISK ASSESSMENT SCORE 1/26  Aberrant behavior: None  My patient has no underlying substance abuse or alcohol abuse and there's no mental health conditions contributing to the patient's pain.        Diagnostic studies:  1/22/2025 MRI cervical spine showed multilevel degenerative changes C4-C7 but no canal stenosis or cord signal multiple facet hypertrophy.  There is increased T1 hyperintense degenerative change in C1-2 involving the C1 vertebral body,:  FINDINGS:  Alignment: There is straightening of the normal cervical lordosis.  There is trace anterolisthesis of C2 on C3 and C7 on T1.      Vertebrae/Intervertebral Discs: The vertebral bodies demonstrate  expected height.  There is patchy increased STIR  signal at C1-C2  which is likely degenerative. The signal extends into the C1  vertebral body on the right. T1 hyperintense lesion within the C3  vertebral bodies compatible with a hemangioma. Bone marrow signal  pattern is otherwise within normal limits.      There is desiccated disc signal throughout the cervical spine with  moderate disc height loss at C3-C4 through C6-C7.      Cord: Normal in caliber and signal.      C1-C2: The cervicomedullary junction appears unremarkable. No spinal  canal stenosis.      C2-C3: No spinal canal or neural foraminal stenosis.      C3-C4: No spinal canal or neural foraminal stenosis.      C4-C5: Disc osteophyte complex, uncovertebral joint hypertrophy and  facet arthrosis. No spinal canal stenosis. Moderate neural foraminal  stenosis.      C5-C6: Disc osteophyte complex, uncovertebral joint hypertrophy and  facet arthrosis. No spinal canal stenosis. Mild bilateral neural  foraminal stenosis.      C6-C7: Disc osteophyte complex uncovertebral joint hypertrophy and  facet arthrosis. No spinal canal stenosis. Moderate bilateral neural  foraminal stenosis.      C7-T1: Disc osteophyte complex and uncovertebral joint hypertrophy  and facet arthrosis. Mild spinal canal stenosis. Moderate bilateral  neural foraminal stenosis.          Prevertebral soft tissues are not thickened.      IMPRESSION:  Degenerative changes of the cervical spine with mild spinal canal  stenosis at C7-T1. Mild-to-moderate neural foraminal narrowing as  detailed above.        Employment/disability/litigation: Retired  now she is a  with her 3 sisters compete [place]    Social History:  with 2 children and 6 grandchildren.  She finished college and master education.  Denies smoking drinking or use of illicit drugs        Review of Systems   HENT: Negative.     Eyes: Negative.    Respiratory: Negative.     Cardiovascular: Negative.    Gastrointestinal: Negative.    Endocrine:  Negative.    Genitourinary: Negative.    Musculoskeletal:  Positive for myalgias and neck pain.   Skin: Negative.    Neurological:  Positive for headaches.   Hematological: Negative.    Psychiatric/Behavioral: Negative.            Physical Exam  Vitals and nursing note reviewed.   Constitutional:       Appearance: Normal appearance.   HENT:      Head: Normocephalic and atraumatic.        Nose: Nose normal.   Eyes:      Extraocular Movements: Extraocular movements intact.      Conjunctiva/sclera: Conjunctivae normal.      Pupils: Pupils are equal, round, and reactive to light.   Cardiovascular:      Rate and Rhythm: Normal rate and regular rhythm.      Pulses: Normal pulses.      Heart sounds: Normal heart sounds.   Pulmonary:      Effort: Pulmonary effort is normal.      Breath sounds: Normal breath sounds.   Abdominal:      General: Abdomen is flat. Bowel sounds are normal.      Palpations: Abdomen is soft.   Skin:     General: Skin is warm.   Neurological:      General: No focal deficit present.      Mental Status: She is alert.      Cranial Nerves: Cranial nerves 2-12 are intact.      Sensory: Sensation is intact.      Motor: Motor function is intact.      Coordination: Coordination is intact.      Gait: Gait is intact.      Deep Tendon Reflexes: Reflexes are normal and symmetric.   Psychiatric:         Mood and Affect: Mood normal.         Behavior: Behavior normal.            Assessment  Very pleasant 74 years old with significant pain in her right side of her neck with radiation to her occipital area just behind her ear correlate with occipital neuralgia specially with the tenderness over the occipital nerve area.  The patient received occipital nerve block in office today with the pain in her head is gone she has tenderness in her neck but she can lives with this.  The patient started on 100 mg gabapentin and she is up to 200 mg at night from her PCP I gave her 300 mg for titration.  Also I gave her  prednisone taper dose for the next few weeks.  I explained to her that I really see degenerative inflammation in her cervical spine and I do not feel that injection at this time is of value for her.  I would like her to take the prednisone and adjust the gabapentin to titration and then I will see her in 6 weeks from now.  I placed an order for right C2-C3 MBB in case that the above failed and consent was signed           Plan  At least 50% of the visit was involved in the discussion of the options for treatment. We discussed exercises, medication, interventional therapies and surgery. Healthy life style is essential with patient hard work to achieve the wellness. In addition; discussion with the patient and/or family about any of the diagnostic results, impressions and/or recommended diagnostic studies, prognosis, risks and benefits of treatment options, instructions for treatment and/or follow-up, importance of compliance with chosen treatment options, risk-factor reduction, and patient/family education.           Recommended Pool therapy, walking in the pool, at least 3x per week for 30 minutes  Recommend self-directed physical therapy with at least daily exercises for minimum of 20-minute, brochure was handed to the patient  Right occipital nerve block done in office today  Prednisone taper dose  Gabapentin 300 mg to titrate with the 100 mg that she received from PCP  Consider right C2-C3 MBB under fluoroscopy guidance of the above failed  Healthy lifestyle and anti-inflammatory diet in addition to weight control discussed with the patient  Alternative chronic pain therapies was discussed, encouraged and information was handed  Return to Clinic 3 months      Procedure: Right occipital nerve block:  Ready to learn, no apparent learning barriers.  Explained treatment plan. Pt. verbalized understanding. Following review of potential side effects and complications (including but not necessarily limited to infection,  allergic reaction, local tissue breakdown, skin blanching, systemic side effects of corticosteroid's, elevation of blood glucose, injury to bodily tissues) they indicated they understood and agreed to proceed.    The procedure was carried out under sterile prep with iodine swab and alcohol. Then size 25G 1.5 in needle was introduced at the left occipital groove, after negative aspiration a mixture of 4cc of ropivacaine 0.5% and Kenalog 40 mg was distributed equally in the area.          Pt. Tolerated the procedure very well and had good resolution of symptoms.       *Please note this report has been produced using speech recognition software and may contain errors related to that system including grammar, punctuation and spelling as well as words and phrases that may be inappropriate. If there are questions or concerns, please feel free to contact me to clarify.    Serina Steve MD

## 2025-04-15 ENCOUNTER — OFFICE VISIT (OUTPATIENT)
Dept: PAIN MEDICINE | Facility: CLINIC | Age: 75
End: 2025-04-15
Payer: MEDICARE

## 2025-04-15 VITALS
HEIGHT: 70 IN | SYSTOLIC BLOOD PRESSURE: 145 MMHG | WEIGHT: 214 LBS | RESPIRATION RATE: 16 BRPM | TEMPERATURE: 97.7 F | HEART RATE: 91 BPM | DIASTOLIC BLOOD PRESSURE: 85 MMHG | OXYGEN SATURATION: 96 % | BODY MASS INDEX: 30.64 KG/M2

## 2025-04-15 DIAGNOSIS — M47.22 CERVICAL SPONDYLOSIS WITH RADICULOPATHY: ICD-10-CM

## 2025-04-15 DIAGNOSIS — G44.86 CERVICOGENIC HEADACHE: ICD-10-CM

## 2025-04-15 DIAGNOSIS — M54.81 OCCIPITAL NEURALGIA OF RIGHT SIDE: Primary | ICD-10-CM

## 2025-04-15 PROCEDURE — 2500000004 HC RX 250 GENERAL PHARMACY W/ HCPCS (ALT 636 FOR OP/ED): Performed by: ANESTHESIOLOGY

## 2025-04-15 PROCEDURE — 64405 NJX AA&/STRD GR OCPL NRV: CPT | Performed by: ANESTHESIOLOGY

## 2025-04-15 PROCEDURE — 99204 OFFICE O/P NEW MOD 45 MIN: CPT | Performed by: ANESTHESIOLOGY

## 2025-04-15 PROCEDURE — 1125F AMNT PAIN NOTED PAIN PRSNT: CPT | Performed by: ANESTHESIOLOGY

## 2025-04-15 PROCEDURE — 1157F ADVNC CARE PLAN IN RCRD: CPT | Performed by: ANESTHESIOLOGY

## 2025-04-15 PROCEDURE — 99214 OFFICE O/P EST MOD 30 MIN: CPT | Mod: 25 | Performed by: ANESTHESIOLOGY

## 2025-04-15 PROCEDURE — 1159F MED LIST DOCD IN RCRD: CPT | Performed by: ANESTHESIOLOGY

## 2025-04-15 PROCEDURE — 96372 THER/PROPH/DIAG INJ SC/IM: CPT | Performed by: ANESTHESIOLOGY

## 2025-04-15 PROCEDURE — 1036F TOBACCO NON-USER: CPT | Performed by: ANESTHESIOLOGY

## 2025-04-15 RX ORDER — PREDNISONE 10 MG/1
TABLET ORAL
Qty: 64 TABLET | Refills: 0 | Status: SHIPPED | OUTPATIENT
Start: 2025-04-15

## 2025-04-15 RX ORDER — TRIAMCINOLONE ACETONIDE 40 MG/ML
40 INJECTION, SUSPENSION INTRA-ARTICULAR; INTRAMUSCULAR ONCE
Status: COMPLETED | OUTPATIENT
Start: 2025-04-15 | End: 2025-04-15

## 2025-04-15 RX ORDER — ROPIVACAINE HYDROCHLORIDE 5 MG/ML
5 INJECTION, SOLUTION EPIDURAL; INFILTRATION; PERINEURAL ONCE
Status: COMPLETED | OUTPATIENT
Start: 2025-04-15 | End: 2025-04-15

## 2025-04-15 RX ORDER — GABAPENTIN 300 MG/1
600 CAPSULE ORAL 3 TIMES DAILY
Qty: 180 CAPSULE | Refills: 2 | Status: SHIPPED | OUTPATIENT
Start: 2025-04-15 | End: 2026-04-15

## 2025-04-15 RX ADMIN — ROPIVACAINE HYDROCHLORIDE 25 MG: 5 INJECTION, SOLUTION EPIDURAL; INFILTRATION; PERINEURAL at 08:30

## 2025-04-15 RX ADMIN — TRIAMCINOLONE ACETONIDE 40 MG: 40 INJECTION, SUSPENSION INTRA-ARTICULAR; INTRAMUSCULAR at 08:53

## 2025-04-15 ASSESSMENT — ENCOUNTER SYMPTOMS
OCCASIONAL FEELINGS OF UNSTEADINESS: 0
NECK PAIN: 1
CARDIOVASCULAR NEGATIVE: 1
GASTROINTESTINAL NEGATIVE: 1
PSYCHIATRIC NEGATIVE: 1
HEMATOLOGIC/LYMPHATIC NEGATIVE: 1
DEPRESSION: 0
LOSS OF SENSATION IN FEET: 0
ENDOCRINE NEGATIVE: 1
MYALGIAS: 1
HEADACHES: 1
EYES NEGATIVE: 1
RESPIRATORY NEGATIVE: 1

## 2025-04-15 ASSESSMENT — PATIENT HEALTH QUESTIONNAIRE - PHQ9
SUM OF ALL RESPONSES TO PHQ9 QUESTIONS 1 AND 2: 0
1. LITTLE INTEREST OR PLEASURE IN DOING THINGS: NOT AT ALL
2. FEELING DOWN, DEPRESSED OR HOPELESS: NOT AT ALL

## 2025-04-15 ASSESSMENT — PAIN - FUNCTIONAL ASSESSMENT: PAIN_FUNCTIONAL_ASSESSMENT: 0-10

## 2025-04-15 ASSESSMENT — PAIN DESCRIPTION - DESCRIPTORS: DESCRIPTORS: STABBING

## 2025-04-15 ASSESSMENT — PAIN SCALES - GENERAL
PAINLEVEL_OUTOF10: 4
PAINLEVEL_OUTOF10: 4

## 2025-04-15 NOTE — PATIENT INSTRUCTIONS
Gabapentin titration     Please take [id number] mg or 300 mg capsules as follows:              AM         PM    Bedtime       Day 1 0 0 1   Day 2 0 0 1   Day 3 0 1 1   Day 4 0 1 1   Day 5 1 1 1   Day 6 1 1 1   Day 7 1 1 1   Day 8 1 1 2   Day 9 1 1 2   Day 10 1 2 2   Day 11 1 2 2   Day 12 2 2 2   Day 13 2 2 2   Day 14 2 2 3   Day 15 2 2 3   Day 16 2 3 3   Day 17 2 3 3   Day 18 3 3 3   Day 19 3 3 3   Day 20 3 3 4   Day 21 3 3 4   Day 22 3 4 4   Day 23 3 4 4   Day 24 4 4 4     Do not exceed 3600mg per day.   Stop and maintain dose when symptoms resolve.  Reduce as instructed if side effects not tolerated.

## 2025-04-15 NOTE — PROGRESS NOTES
Chief Complaint   Patient presents with    New Patient Visit     History of Present Complaint:  The patient was referred to us by Referring Provider: Jakob Estes . this is 74 y.o.  female  with a past history of  anxiety/depression on Zoloft, obesity BMI 31, diabetes, arthritis and musculoskeletal pain with severe right-sided neck pain failed to respond to MBB  presenting with Right sided neck.    Pain started 8/24/2024 after kiddy roller coaster .  Pain is better with not moving .  Pain is worst any movement .    The pain is described as stabbing up along her right ear , limited range of motion , unable to sleep , mornings are bad .     Prior Pain Therapies:  Dry needling physical therapy , electric stim massage ,3 rounds of prednisone, chiropractic, medial branch block failed,    Past surgical history:   Cataract, partial mastectomy of the right breast, surgery for kidney stones, tubal ligation, right hip replaced, left knee replacements    Employment/disability/litigation: retired     Social history: , Has 2children, 6grandchildren and 0great-grandchildren, Finished high school, and Finished college major in masters in education    Diagnostic studies: MRI studies        Bill Each Box that Applies Female Male   FAMILY HISTORY OF SUBSTANCE ABUSE  Bill the boxes that applies   Alcohol ?  1    ? 3   Illegal drugs ?  2 ? 3   Rx drugs ?  4 ? 4   PERSONAL HISTORY OF SUBSTNACE ABUSE   Alcohol ?  3 ?  3   Illegal drugs ?  4 ?  4    Rx drugs ?  5 ?  5   Age Between 16-45 years ?  1 ?  1   History of Preadolescent Sexual Abuse ?  3 ?  0   PSYCHOLOGIC DISEASE   ADD, OCD, bipolar, schizophrenia   ?  2 ?  2   Depression x 1 anxiety  ?  1   Scoring Totals       Scoring (Risk)  0-3 - Low  4-7 - Moderate  8 - High  Opioid Risk Assessment Score 1/26

## 2025-05-02 ENCOUNTER — TELEPHONE (OUTPATIENT)
Dept: PAIN MEDICINE | Facility: CLINIC | Age: 75
End: 2025-05-02
Payer: MEDICARE

## 2025-05-07 PROBLEM — G44.86 CERVICOGENIC HEADACHE: Status: ACTIVE | Noted: 2025-05-07

## 2025-05-07 PROBLEM — M47.812 CERVICAL SPONDYLOSIS: Status: ACTIVE | Noted: 2025-05-07

## 2025-05-07 PROBLEM — M54.81 OCCIPITAL NEURALGIA OF RIGHT SIDE: Status: ACTIVE | Noted: 2025-05-07

## 2025-05-19 ENCOUNTER — HOSPITAL ENCOUNTER (OUTPATIENT)
Dept: PAIN MEDICINE | Facility: CLINIC | Age: 75
Discharge: HOME | End: 2025-05-19
Payer: MEDICARE

## 2025-05-19 VITALS
TEMPERATURE: 99 F | HEART RATE: 85 BPM | BODY MASS INDEX: 30.64 KG/M2 | WEIGHT: 214 LBS | OXYGEN SATURATION: 94 % | HEIGHT: 70 IN | RESPIRATION RATE: 16 BRPM

## 2025-05-19 DIAGNOSIS — G44.86 CERVICOGENIC HEADACHE: ICD-10-CM

## 2025-05-19 DIAGNOSIS — M54.81 OCCIPITAL NEURALGIA OF RIGHT SIDE: ICD-10-CM

## 2025-05-19 PROCEDURE — 64490 INJ PARAVERT F JNT C/T 1 LEV: CPT | Performed by: ANESTHESIOLOGY

## 2025-05-19 PROCEDURE — 2500000004 HC RX 250 GENERAL PHARMACY W/ HCPCS (ALT 636 FOR OP/ED): Mod: JZ | Performed by: ANESTHESIOLOGY

## 2025-05-19 RX ORDER — LIDOCAINE HYDROCHLORIDE 5 MG/ML
INJECTION, SOLUTION INFILTRATION; INTRAVENOUS AS NEEDED
Status: COMPLETED | OUTPATIENT
Start: 2025-05-19 | End: 2025-05-19

## 2025-05-19 RX ORDER — TRIAMCINOLONE ACETONIDE 40 MG/ML
INJECTION, SUSPENSION INTRA-ARTICULAR; INTRAMUSCULAR AS NEEDED
Status: COMPLETED | OUTPATIENT
Start: 2025-05-19 | End: 2025-05-19

## 2025-05-19 RX ORDER — BUPIVACAINE HYDROCHLORIDE 7.5 MG/ML
INJECTION, SOLUTION EPIDURAL; RETROBULBAR AS NEEDED
Status: COMPLETED | OUTPATIENT
Start: 2025-05-19 | End: 2025-05-19

## 2025-05-19 RX ADMIN — LIDOCAINE HYDROCHLORIDE 10 ML: 5 INJECTION, SOLUTION INFILTRATION at 14:24

## 2025-05-19 RX ADMIN — BUPIVACAINE HYDROCHLORIDE 10 ML: 7.5 INJECTION, SOLUTION EPIDURAL; RETROBULBAR at 14:24

## 2025-05-19 RX ADMIN — TRIAMCINOLONE ACETONIDE 40 MG: 40 INJECTION, SUSPENSION INTRA-ARTICULAR; INTRAMUSCULAR at 14:24

## 2025-05-19 ASSESSMENT — PAIN SCALES - GENERAL
PAINLEVEL_OUTOF10: 4
PAINLEVEL_OUTOF10: 0 - NO PAIN

## 2025-05-19 ASSESSMENT — PAIN - FUNCTIONAL ASSESSMENT: PAIN_FUNCTIONAL_ASSESSMENT: 0-10

## 2025-05-19 NOTE — H&P (VIEW-ONLY)
Recording using [employer] software for draft documentation of the visit was discussed with the patient/authorized representative; all questions welcomed and answered.  Patient/authorized representative agreed to proceed  Subjective  HPI  Mary Carrillo is pleasant  74 year old female who presents for Blood Pressure Check  Health maintenance and preventive screening was discussed and recommended.    Pt denies acute chest pain or acute dyspnea.Denies any acute nausea,vomitting or diarrhea.No acute headache or dizziness.No abdominal pain.No hematochezia,jessee or hematuria.No recent falls or acute changes in memory or mood.Denies any acute GI/ symptoms.No acute focal weakness,tingling or numbness in extremities.  MRI C SPINE-  Degenerative changes of the cervical spine with mild spinal canal   stenosis at C7-T1. Mild-to-moderate neural foraminal narrowing as   detailed above.   Last bone density was March 2025       Hypertension:  - Reports improved blood pressure control with current medication regimen.  - Recent blood work showed elevated blood glucose levels; Mary is actively working on management.  - Liver and kidney function tests were normal.  - Cholesterol levels have improved.    Neck Pain:  - Chronic neck pain, previously severe enough to limit activities such as vacuuming.  - Recent treatment by Dr. Wyman included a corticosteroid injection and a 5-week course of Prednisone, completed last week.  - Currently taking Gabapentin 2600 mg daily, divided into doses of 300 mg and 100 mg tablets.  - Reports significant pain relief with current treatment, allowing for improved functionality.  - Follow-up appointment with Dr. Wyman scheduled for today.    Breast Cancer:  - History of breast cancer diagnosed over 20 years ago.    PAST MEDICAL HISTORY   Diagnosis Date   • Adjustment disorder with mixed disturbance of emotions and conduct 10/31/2014   • Hyperglycemia    • Malignant neoplasm of overlapping sites of  right breast in female, estrogen receptor positive (HCC)    • Primary hypertension      PAST SURGICAL HISTORY   Procedure Laterality Date   • COLONOSCOPY SCREENING      every 5 years; last in 2016   • D&C, DIAG AND/OR THERAPEUTIC     • LIGATE FALLOPIAN TUBE     • REMOVAL GALLBLADDER     • TOTAL KNEE REPLACEMENT Left      Current Outpatient Medications on File Prior to Visit   Medication Sig   • metoprolol succinate ER (TOPROL XL) 25 mg 24 hr tablet Take 1 tablet by mouth once daily.   • triamterene-hydroCHLOROthiazide (DYAZIDE) 37.5-25 mg per capsule Take 1 capsule by mouth once daily.   • gabapentin (NEURONTIN) 100 mg capsule 1 pill in morning and 3 at night   • cloNIDine HCl (CATAPRES) 0.3 mg tablet TAKE 1 TABLET TWICE A DAY   • metFORMIN (GLUCOPHAGE) 500 mg tablet TAKE 1 TABLET DAILY WITH   BREAKFAST   • tretinoin (RETIN-A) 0.025 % gel Apply to affected area daily at bedtime.   • sertraline (ZOLOFT) 50 mg tablet Take 1 tablet by mouth once daily.     No current facility-administered medications on file prior to visit.     Social History     Tobacco Use   • Smoking status: Never   • Smokeless tobacco: Never   Substance Use Topics   • Alcohol use: Not Currently   • Drug use: Never       Review of Systems   Constitutional:  Negative for chills, fever and weight loss.   HENT:  Negative for hearing loss and sinus pain.    Eyes:  Negative for blurred vision and double vision.   Respiratory:  Negative for cough and shortness of breath.    Cardiovascular:  Negative for chest pain, palpitations and orthopnea.   Gastrointestinal:  Negative for abdominal pain, blood in stool, heartburn, nausea and vomiting.   Genitourinary:  Negative for dysuria and urgency.   Musculoskeletal:  Positive for neck pain. Negative for joint pain and myalgias.   Skin:  Negative for rash.   Neurological:  Negative for dizziness, focal weakness and headaches.   Psychiatric/Behavioral:  Negative for depression, memory loss and suicidal ideas. The  patient is not nervous/anxious and does not have insomnia.           Objective  Physical Exam  Vitals and nursing note reviewed.   Constitutional:       General: She is not in acute distress.  HENT:      Head: Normocephalic and atraumatic.      Right Ear: External ear normal.      Left Ear: External ear normal.      Nose: Nose normal.      Mouth/Throat:      Pharynx: No oropharyngeal exudate.   Eyes:      Pupils: Pupils are equal, round, and reactive to light.   Neck:      Thyroid: No thyromegaly.      Vascular: No JVD.      Trachea: No tracheal deviation.   Cardiovascular:      Rate and Rhythm: Normal rate and regular rhythm.      Heart sounds: Normal heart sounds. No murmur heard.     No friction rub. No gallop.   Pulmonary:      Effort: Pulmonary effort is normal. No respiratory distress.      Breath sounds: Normal breath sounds. No stridor. No wheezing or rales.   Chest:      Chest wall: No tenderness.   Abdominal:      General: Bowel sounds are normal. There is no distension.      Palpations: Abdomen is soft. There is no mass.      Tenderness: There is no abdominal tenderness. There is no guarding or rebound.   Musculoskeletal:         General: No tenderness.      Cervical back: Normal range of motion and neck supple.      Comments: Decreased neck rotation due to pain   Lymphadenopathy:      Cervical: No cervical adenopathy.   Skin:     General: Skin is warm and dry.      Findings: No rash.   Neurological:      Mental Status: She is alert and oriented to person, place, and time.      Gait: Gait is intact.   Psychiatric:         Mood and Affect: Mood and affect normal.        1. HX: breast cancer (Z85.3)  - History of breast cancer 20 years ago; no current issues reported.    2. Generalized osteoarthritis (M15.9)  - Clinically stable.    3. Benign essential hypertension (I10)  - Well-controlled on current medication regimen.    4. Dyslipidemia (E78.5)  - Recent labs show improvement in cholesterol levels.  -  Next blood work scheduled for October.    5. Prediabetes (R73.03)  - Recent labs show slightly elevated glucose levels.  - Next blood work scheduled for October.    6. Vitamin D deficiency (E55.9)  - Clinically stable.    7. Osteopenia, unspecified location (M85.80)  - Clinically stable.    8. High risk medication use (Z79.899)  - Currently on gabapentin 2600 mg daily for cervical radiculopathy.  - Ordered liver function tests to monitor potential hepatotoxicity.    9. Cervical radiculopathy (M54.12)  - Recent treatment with fluoroscopic-guided injection, prednisone taper, and gabapentin.  - Reports significant improvement in pain and functionality.  - Follow-up with Dr. Wyman scheduled for today.

## 2025-06-24 ENCOUNTER — APPOINTMENT (OUTPATIENT)
Dept: PAIN MEDICINE | Facility: CLINIC | Age: 75
End: 2025-06-24
Payer: MEDICARE

## 2025-06-24 VITALS
WEIGHT: 214 LBS | HEART RATE: 94 BPM | BODY MASS INDEX: 30.64 KG/M2 | DIASTOLIC BLOOD PRESSURE: 77 MMHG | TEMPERATURE: 97.7 F | RESPIRATION RATE: 16 BRPM | HEIGHT: 70 IN | OXYGEN SATURATION: 95 % | SYSTOLIC BLOOD PRESSURE: 126 MMHG

## 2025-06-24 DIAGNOSIS — G44.86 CERVICOGENIC HEADACHE: ICD-10-CM

## 2025-06-24 DIAGNOSIS — M47.812 CERVICAL SPONDYLOSIS: Primary | ICD-10-CM

## 2025-06-24 PROCEDURE — 1036F TOBACCO NON-USER: CPT | Performed by: ANESTHESIOLOGY

## 2025-06-24 PROCEDURE — 99214 OFFICE O/P EST MOD 30 MIN: CPT | Performed by: ANESTHESIOLOGY

## 2025-06-24 PROCEDURE — 1159F MED LIST DOCD IN RCRD: CPT | Performed by: ANESTHESIOLOGY

## 2025-06-24 PROCEDURE — 1125F AMNT PAIN NOTED PAIN PRSNT: CPT | Performed by: ANESTHESIOLOGY

## 2025-06-24 RX ORDER — GABAPENTIN 600 MG/1
900 TABLET ORAL 3 TIMES DAILY
Qty: 135 TABLET | Refills: 11 | Status: SHIPPED | OUTPATIENT
Start: 2025-06-24 | End: 2026-06-24

## 2025-06-24 RX ORDER — TRAMADOL HYDROCHLORIDE 50 MG/1
50 TABLET, FILM COATED ORAL EVERY 8 HOURS PRN
Qty: 15 TABLET | Refills: 0 | Status: SHIPPED | OUTPATIENT
Start: 2025-06-24 | End: 2025-06-29

## 2025-06-24 ASSESSMENT — PAIN - FUNCTIONAL ASSESSMENT: PAIN_FUNCTIONAL_ASSESSMENT: 0-10

## 2025-06-24 ASSESSMENT — PAIN SCALES - GENERAL
PAINLEVEL_OUTOF10: 4
PAINLEVEL_OUTOF10: 4

## 2025-06-24 ASSESSMENT — PAIN DESCRIPTION - DESCRIPTORS: DESCRIPTORS: SHARP

## 2025-06-24 ASSESSMENT — ENCOUNTER SYMPTOMS
DEPRESSION: 0
OCCASIONAL FEELINGS OF UNSTEADINESS: 0
LOSS OF SENSATION IN FEET: 0

## 2025-06-24 NOTE — PROGRESS NOTES
This is 74 y.o.  female with who has been treated for Cervical pain . Pain is 30 -40 %  better. Continues to have pain.The pain is described as stiffness in her neck, when moving her head up and down , she has bone on bone pain , pain on the right side of her neck that radiates to her right ear which is sharp  and is relieved by not moving . here for follow-up   Chief Complaint   Patient presents with    Follow-up      5/19/2025 Right C2, C3 Cervical Medial Branch Block        Gabapentin 900 mg TID   Pain Therapies: 5/19/2025 Right C2, C3 Cervical Medial Branch Block

## 2025-06-24 NOTE — H&P (VIEW-ONLY)
SUBJECTIVE:  This is a very pleasant retired  and a  74 y.o.  female with PMH of anxiety/depression on Zoloft, obesity BMI 31, diabetes, arthritis and musculoskeletal pain with severe right-sided neck pain failed to respond to MBB by Dr. Parker without much benefit presenting with right-sided neck pain but the worst pain is shooting pain behind the ear to her right occiput.  Patient treated with gabapentin 300 x 2 x 3 daily, unsuccessful Rt. occipital nerve block with temporary relief then followed by right C2-3 MBB on 4/15/2025 who is here for follow-up stating that the C2-3 MBB has helped significantly with her right-sided neck pain but she has mostly the whole neck pain now and she has some headaches associated with that.  I reviewed her MRI again and she has a lot of degenerative changes there I think it is time to give her epidural steroid injection right-sided to see if that helps calm down the sensations and has been accumulative effect with the medial branch block.  I started the patient on tramadol to take with acetaminophen.  She is currently on gabapentin 900 mg 3 times a day      Prior office visit:  4/15/2025: Provider: Jakob Estes. this is a very pleasant retired  and a  74 y.o.  female with a past history of anxiety/depression on Zoloft, obesity BMI 31, diabetes, arthritis and musculoskeletal pain with severe right-sided neck pain failed to respond to MBB by Dr. Parker without much benefit presenting with right-sided neck pain but the worst pain is shooting pain behind the ear to her right occiput.  The patient does not have any pain in her upper extremities no numbness no tingling no paralysis.  The patient does not have any balance issues.  The patient denies any red flags  Assessment  Very pleasant 74 years old with significant pain in her right side of her neck with radiation to her occipital area just behind her ear correlate with occipital  neuralgia specially with the tenderness over the occipital nerve area.  The patient received occipital nerve block in office today with the pain in her head is gone she has tenderness in her neck but she can lives with this.  The patient started on 100 mg gabapentin and she is up to 200 mg at night from her PCP I gave her 300 mg for titration.  Also I gave her prednisone taper dose for the next few weeks.  I explained to her that I really see degenerative inflammation in her cervical spine and I do not feel that injection at this time is of value for her.  I would like her to take the prednisone and adjust the gabapentin to titration and then I will see her in 6 weeks from now.  I placed an order for right C2-C3 MBB in case that the above failed and consent was signed  Plan:  Right occipital nerve block done in office today  Prednisone taper dose  Gabapentin 300 mg to titrate with the 100 mg that she received from PCP  Consider right C2-C3 MBB under fluoroscopy guidance of the above failed         Procedures:  5/19/2025 right C2-3 MBB the patient has had a 80% initially improvement in pain and function  Right occipital nerve block the patient has had a 80% initially improvement in pain and function  2/28/2025 right C4-6 MBB by Dr. Parker without much benefit         Portions of record reviewed for pertinent issues: active problem list, medication list, allergies, family history, social history, notes from last encounter, encounters, lab results, imaging and other available records.     I have personally reviewed the [facility] report for this patient. This report is scanned into the electronic medical record. I have considered the risks of abuse, dependence, addiction and diversion. It showed: Gabapentin 100 mg and few T3  OPIOID RISK ASSESSMENT SCORE 1/26  Aberrant behavior: None  My patient has no underlying substance abuse or alcohol abuse and there's no mental health conditions contributing to the patient's pain.            Diagnostic studies:  1/22/2025 MRI cervical spine showed multilevel degenerative changes C4-C7 but no canal stenosis or cord signal multiple facet hypertrophy.  There is increased T1 hyperintense degenerative change in C1-2 involving the C1 vertebral body,:  FINDINGS:  Alignment: There is straightening of the normal cervical lordosis.  There is trace anterolisthesis of C2 on C3 and C7 on T1.      Vertebrae/Intervertebral Discs: The vertebral bodies demonstrate  expected height.  There is patchy increased STIR signal at C1-C2  which is likely degenerative. The signal extends into the C1  vertebral body on the right. T1 hyperintense lesion within the C3  vertebral bodies compatible with a hemangioma. Bone marrow signal  pattern is otherwise within normal limits.      There is desiccated disc signal throughout the cervical spine with  moderate disc height loss at C3-C4 through C6-C7.      Cord: Normal in caliber and signal.      C1-C2: The cervicomedullary junction appears unremarkable. No spinal  canal stenosis.      C2-C3: No spinal canal or neural foraminal stenosis.      C3-C4: No spinal canal or neural foraminal stenosis.      C4-C5: Disc osteophyte complex, uncovertebral joint hypertrophy and  facet arthrosis. No spinal canal stenosis. Moderate neural foraminal  stenosis.      C5-C6: Disc osteophyte complex, uncovertebral joint hypertrophy and  facet arthrosis. No spinal canal stenosis. Mild bilateral neural  foraminal stenosis.      C6-C7: Disc osteophyte complex uncovertebral joint hypertrophy and  facet arthrosis. No spinal canal stenosis. Moderate bilateral neural  foraminal stenosis.      C7-T1: Disc osteophyte complex and uncovertebral joint hypertrophy  and facet arthrosis. Mild spinal canal stenosis. Moderate bilateral  neural foraminal stenosis.          Prevertebral soft tissues are not thickened.      IMPRESSION:  Degenerative changes of the cervical spine with mild spinal canal  stenosis at  C7-T1. Mild-to-moderate neural foraminal narrowing as  detailed above.          Employment/disability/litigation: Retired  now she is a  with her 3 sisters compete [place]     Social History:  with 2 children and 6 grandchildren.  She finished college and master education.  Denies smoking drinking or use of illicit drugs           Review of Systems   HENT: Negative.     Eyes: Negative.    Respiratory: Negative.     Cardiovascular: Negative.    Gastrointestinal: Negative.    Endocrine: Negative.    Genitourinary: Negative.    Musculoskeletal:  Positive for myalgias and neck pain.   Skin: Negative.    Neurological:  Positive for headaches.   Hematological: Negative.    Psychiatric/Behavioral: Negative.            Physical Exam  Vitals and nursing note reviewed.   Constitutional:       Appearance: Normal appearance.   HENT:      Head: Normocephalic and atraumatic.      Nose: Nose normal.   Eyes:      Extraocular Movements: Extraocular movements intact.      Conjunctiva/sclera: Conjunctivae normal.      Pupils: Pupils are equal, round, and reactive to light.   Neck:     Cardiovascular:      Rate and Rhythm: Normal rate and regular rhythm.      Pulses: Normal pulses.      Heart sounds: Normal heart sounds.   Pulmonary:      Effort: Pulmonary effort is normal.      Breath sounds: Normal breath sounds.   Abdominal:      General: Abdomen is flat. Bowel sounds are normal.      Palpations: Abdomen is soft.   Musculoskeletal:         General: Tenderness present.   Skin:     General: Skin is warm.   Neurological:      General: No focal deficit present.      Mental Status: She is alert and oriented to person, place, and time.      Cranial Nerves: Cranial nerves 2-12 are intact.      Sensory: Sensation is intact.      Motor: Motor function is intact.      Coordination: Coordination is intact.      Gait: Gait is intact.      Deep Tendon Reflexes: Reflexes are normal and symmetric.    Psychiatric:         Mood and Affect: Mood normal.         Behavior: Behavior normal.                      Plan  At least 50% of the visit was involved in the discussion of the options for treatment. We discussed exercises, medication, interventional therapies and surgery. Healthy life style is essential with patient hard work to achieve the wellness. In addition; discussion with the patient and/or family about any of the diagnostic results, impressions and/or recommended diagnostic studies, prognosis, risks and benefits of treatment options, instructions for treatment and/or follow-up, importance of compliance with chosen treatment options, risk-factor reduction, and patient/family education.         Continue self-directed physical therapy at least daily exercises for minimum of 20-minute  Gabapentin 600 mg tabs take 1.5 tab x 3 daily  Start tramadol 50 mg with acetaminophen 1000 mg 3 times daily as needed  Right C6-7 interlaminar ONESIMO  Consider repeat right C2-3 in the future for possible  Healthy lifestyle and anti-inflammatory diet in addition to weight control discussed with the patient  Alternative chronic pain therapies was discussed, encouraged and information was handed  Return to Clinic after injection     *Please note this report has been produced using speech recognition software and may contain errors related to that system including grammar, punctuation and spelling as well as words and phrases that may be inappropriate. If there are questions or concerns, please feel free to contact me to clarify.    Serina Steve MD

## 2025-07-21 ENCOUNTER — HOSPITAL ENCOUNTER (OUTPATIENT)
Dept: PAIN MEDICINE | Facility: CLINIC | Age: 75
Discharge: HOME | End: 2025-07-21
Payer: MEDICARE

## 2025-07-21 VITALS
WEIGHT: 214 LBS | RESPIRATION RATE: 16 BRPM | HEIGHT: 70 IN | SYSTOLIC BLOOD PRESSURE: 132 MMHG | HEART RATE: 100 BPM | TEMPERATURE: 98.6 F | DIASTOLIC BLOOD PRESSURE: 82 MMHG | OXYGEN SATURATION: 95 % | BODY MASS INDEX: 30.64 KG/M2

## 2025-07-21 DIAGNOSIS — M47.812 CERVICAL SPONDYLOSIS: ICD-10-CM

## 2025-07-21 DIAGNOSIS — G44.86 CERVICOGENIC HEADACHE: ICD-10-CM

## 2025-07-21 PROCEDURE — 7100000009 HC PHASE TWO TIME - INITIAL BASE CHARGE

## 2025-07-21 PROCEDURE — 7100000010 HC PHASE TWO TIME - EACH INCREMENTAL 1 MINUTE

## 2025-07-21 PROCEDURE — 2500000004 HC RX 250 GENERAL PHARMACY W/ HCPCS (ALT 636 FOR OP/ED): Performed by: ANESTHESIOLOGY

## 2025-07-21 PROCEDURE — 62321 NJX INTERLAMINAR CRV/THRC: CPT | Performed by: ANESTHESIOLOGY

## 2025-07-21 PROCEDURE — A9575 INJ GADOTERATE MEGLUMI 0.1ML: HCPCS | Mod: JW | Performed by: ANESTHESIOLOGY

## 2025-07-21 PROCEDURE — 2550000001 HC RX 255 CONTRASTS: Mod: JW | Performed by: ANESTHESIOLOGY

## 2025-07-21 RX ORDER — METHYLPREDNISOLONE ACETATE 80 MG/ML
INJECTION, SUSPENSION INTRA-ARTICULAR; INTRALESIONAL; INTRAMUSCULAR; SOFT TISSUE AS NEEDED
Status: COMPLETED | OUTPATIENT
Start: 2025-07-21 | End: 2025-07-21

## 2025-07-21 RX ORDER — LIDOCAINE HYDROCHLORIDE AND EPINEPHRINE 10; 10 UG/ML; MG/ML
INJECTION, SOLUTION INFILTRATION; PERINEURAL AS NEEDED
Status: COMPLETED | OUTPATIENT
Start: 2025-07-21 | End: 2025-07-21

## 2025-07-21 RX ORDER — LIDOCAINE HYDROCHLORIDE 5 MG/ML
INJECTION, SOLUTION INFILTRATION; INTRAVENOUS AS NEEDED
Status: COMPLETED | OUTPATIENT
Start: 2025-07-21 | End: 2025-07-21

## 2025-07-21 RX ORDER — GADOTERATE MEGLUMINE 376.9 MG/ML
INJECTION INTRAVENOUS AS NEEDED
Status: COMPLETED | OUTPATIENT
Start: 2025-07-21 | End: 2025-07-21

## 2025-07-21 RX ADMIN — LIDOCAINE HYDROCHLORIDE 10 ML: 5 INJECTION, SOLUTION INFILTRATION at 09:25

## 2025-07-21 RX ADMIN — METHYLPREDNISOLONE ACETATE 80 MG: 80 INJECTION, SUSPENSION INTRA-ARTICULAR; INTRALESIONAL; INTRAMUSCULAR; SOFT TISSUE at 09:29

## 2025-07-21 RX ADMIN — GADOTERATE MEGLUMINE 3 ML: 376.9 INJECTION INTRAVENOUS at 09:29

## 2025-07-21 RX ADMIN — LIDOCAINE HYDROCHLORIDE,EPINEPHRINE BITARTRATE 10 ML: 10; .01 INJECTION, SOLUTION INFILTRATION; PERINEURAL at 09:26

## 2025-07-21 ASSESSMENT — PAIN - FUNCTIONAL ASSESSMENT: PAIN_FUNCTIONAL_ASSESSMENT: 0-10

## 2025-07-21 ASSESSMENT — PAIN SCALES - GENERAL
PAINLEVEL_OUTOF10: 1
PAINLEVEL_OUTOF10: 3

## 2025-07-21 ASSESSMENT — PAIN DESCRIPTION - DESCRIPTORS: DESCRIPTORS: ACHING

## 2025-09-02 ENCOUNTER — APPOINTMENT (OUTPATIENT)
Dept: PAIN MEDICINE | Facility: CLINIC | Age: 75
End: 2025-09-02
Payer: MEDICARE

## 2025-09-02 ASSESSMENT — ENCOUNTER SYMPTOMS
HEADACHES: 1
PALPITATIONS: 0
NECK STIFFNESS: 1
NECK PAIN: 1
LOSS OF SENSATION IN FEET: 0
CHILLS: 0
DEPRESSION: 0
DIZZINESS: 0
CONFUSION: 0
CHEST TIGHTNESS: 0
WHEEZING: 0
SHORTNESS OF BREATH: 0
FREQUENCY: 0
NUMBNESS: 0
AGITATION: 0
OCCASIONAL FEELINGS OF UNSTEADINESS: 1
MYALGIAS: 1
FATIGUE: 0

## 2025-09-02 ASSESSMENT — PAIN - FUNCTIONAL ASSESSMENT: PAIN_FUNCTIONAL_ASSESSMENT: 0-10

## 2025-09-02 ASSESSMENT — PAIN SCALES - GENERAL
PAINLEVEL_OUTOF10: 2
PAINLEVEL_OUTOF10: 2

## 2025-09-02 ASSESSMENT — PAIN DESCRIPTION - DESCRIPTORS: DESCRIPTORS: SHARP

## 2025-09-08 ENCOUNTER — APPOINTMENT (OUTPATIENT)
Dept: PAIN MEDICINE | Facility: CLINIC | Age: 75
End: 2025-09-08
Payer: MEDICARE